# Patient Record
Sex: MALE | Race: WHITE | HISPANIC OR LATINO | URBAN - METROPOLITAN AREA
[De-identification: names, ages, dates, MRNs, and addresses within clinical notes are randomized per-mention and may not be internally consistent; named-entity substitution may affect disease eponyms.]

---

## 2019-06-22 ENCOUNTER — APPOINTMENT (RX ONLY)
Dept: URBAN - METROPOLITAN AREA CLINIC 154 | Facility: CLINIC | Age: 29
Setting detail: DERMATOLOGY
End: 2019-06-22

## 2019-06-22 DIAGNOSIS — D22 MELANOCYTIC NEVI: ICD-10-CM

## 2019-06-22 DIAGNOSIS — L72.0 EPIDERMAL CYST: ICD-10-CM | Status: INADEQUATELY CONTROLLED

## 2019-06-22 PROBLEM — D22.5 MELANOCYTIC NEVI OF TRUNK: Status: ACTIVE | Noted: 2019-06-22

## 2019-06-22 PROCEDURE — ? DEFER

## 2019-06-22 PROCEDURE — ? TREATMENT REGIMEN

## 2019-06-22 PROCEDURE — ? INVENTORY

## 2019-06-22 PROCEDURE — ? COUNSELING

## 2019-06-22 PROCEDURE — 99201: CPT

## 2019-06-22 PROCEDURE — ? PRESCRIPTION

## 2019-06-22 RX ORDER — MUPIROCIN 20 MG/G
OINTMENT TOPICAL BID
Qty: 1 | Refills: 1 | Status: ERX | COMMUNITY
Start: 2019-06-22

## 2019-06-22 RX ADMIN — MUPIROCIN: 20 OINTMENT TOPICAL at 12:49

## 2019-06-22 ASSESSMENT — LOCATION SIMPLE DESCRIPTION DERM
LOCATION SIMPLE: RIGHT BUTTOCK
LOCATION SIMPLE: GLUTEAL CLEFT

## 2019-06-22 ASSESSMENT — LOCATION ZONE DERM: LOCATION ZONE: TRUNK

## 2019-06-22 ASSESSMENT — LOCATION DETAILED DESCRIPTION DERM
LOCATION DETAILED: RIGHT MEDIAL BUTTOCK
LOCATION DETAILED: GLUTEAL CLEFT
LOCATION DETAILED: RIGHT BUTTOCK

## 2019-06-22 NOTE — PROCEDURE: MIPS QUALITY
Quality 431: Preventive Care And Screening: Unhealthy Alcohol Use - Screening: Patient screened for unhealthy alcohol use using a single question and scores less than 2 times per year
Detail Level: Detailed
Quality 226: Preventive Care And Screening: Tobacco Use: Screening And Cessation Intervention: Patient screened for tobacco use and is an ex/non-smoker
Quality 130: Documentation Of Current Medications In The Medical Record: Eligible clinician attests to documenting in the medical record the patient is not eligible for a current list of medications being obtained, updated, or reviewed by the eligible clinician

## 2019-06-22 NOTE — PROCEDURE: TREATMENT REGIMEN
Plan: Will plan excision and send in oral antibiotics at that time upon completion of surgery. Discussed risks of excision including but not limited to infection and bleeding, he verbally understood
Continue Regimen: Warm moist epsom salt compresses multiple times per day, mupirocin ointment bid
Detail Level: Zone

## 2019-07-03 ENCOUNTER — APPOINTMENT (RX ONLY)
Dept: URBAN - METROPOLITAN AREA CLINIC 155 | Facility: CLINIC | Age: 29
Setting detail: DERMATOLOGY
End: 2019-07-03

## 2019-07-03 DIAGNOSIS — L72.0 EPIDERMAL CYST: ICD-10-CM

## 2019-07-03 PROCEDURE — ? EXCISION

## 2019-07-03 PROCEDURE — 12032 INTMD RPR S/A/T/EXT 2.6-7.5: CPT

## 2019-07-03 PROCEDURE — 11404 EXC TR-EXT B9+MARG 3.1-4 CM: CPT

## 2019-07-03 PROCEDURE — ? EDUCATIONAL RESOURCES PROVIDED

## 2019-07-03 ASSESSMENT — LOCATION SIMPLE DESCRIPTION DERM: LOCATION SIMPLE: RIGHT BUTTOCK

## 2019-07-03 ASSESSMENT — LOCATION ZONE DERM: LOCATION ZONE: TRUNK

## 2019-07-03 ASSESSMENT — LOCATION DETAILED DESCRIPTION DERM: LOCATION DETAILED: RIGHT MEDIAL BUTTOCK

## 2019-07-09 ENCOUNTER — APPOINTMENT (RX ONLY)
Dept: URBAN - METROPOLITAN AREA CLINIC 155 | Facility: CLINIC | Age: 29
Setting detail: DERMATOLOGY
End: 2019-07-09

## 2019-07-09 DIAGNOSIS — Z48.817 ENCOUNTER FOR SURGICAL AFTERCARE FOLLOWING SURGERY ON THE SKIN AND SUBCUTANEOUS TISSUE: ICD-10-CM

## 2019-07-09 PROCEDURE — ? POST-OP WOUND EVALUATION

## 2019-07-09 PROCEDURE — 99024 POSTOP FOLLOW-UP VISIT: CPT

## 2019-07-09 PROCEDURE — ? ORDER TESTS

## 2019-07-09 PROCEDURE — ? PRESCRIPTION

## 2019-07-09 PROCEDURE — ? MEDICATION COUNSELING

## 2019-07-09 RX ORDER — DOXYCYCLINE HYCLATE 100 MG/1
CAPSULE, GELATIN COATED ORAL
Qty: 14 | Refills: 0 | Status: ERX | COMMUNITY
Start: 2019-07-09

## 2019-07-09 RX ADMIN — DOXYCYCLINE HYCLATE: 100 CAPSULE, GELATIN COATED ORAL at 15:03

## 2019-07-09 ASSESSMENT — LOCATION DETAILED DESCRIPTION DERM: LOCATION DETAILED: RIGHT MEDIAL BUTTOCK

## 2019-07-09 ASSESSMENT — LOCATION ZONE DERM: LOCATION ZONE: TRUNK

## 2019-07-09 ASSESSMENT — LOCATION SIMPLE DESCRIPTION DERM: LOCATION SIMPLE: RIGHT BUTTOCK

## 2019-07-09 NOTE — PROCEDURE: MEDICATION COUNSELING
Xelarunz Pregnancy And Lactation Text: This medication is Pregnancy Category D and is not considered safe during pregnancy.  The risk during breast feeding is also uncertain.

## 2019-07-09 NOTE — PROCEDURE: ORDER TESTS
Expected Date Of Service: 07/09/2019
Bill For Surgical Tray: no
Performing Laboratory: -15
Billing Type: Third-Party Bill

## 2019-07-09 NOTE — PROCEDURE: MEDICATION COUNSELING
Nausea subsiding   Clofazimine Counseling:  I discussed with the patient the risks of clofazimine including but not limited to skin and eye pigmentation, liver damage, nausea/vomiting, gastrointestinal bleeding and allergy.

## 2019-07-09 NOTE — PROCEDURE: POST-OP WOUND EVALUATION
Wound Discharge?: minimal discharge
Sutures?: intact
Wound Granulation?: early
Add 74397 Cpt? (Important Note: In 2017 The Use Of 05495 Is Being Tracked By Cms To Determine Future Global Period Reimbursement For Global Periods): yes
Wound Diameter In Cm(Optional): 0
Wound Edema?: moderate
Detail Level: Detailed
Wound Evaluated By (Optional): ALFONSO Sandra. Kristen DE GUZMAN
Wound Drainage?: purulent drainage
Wound Color?: red
Wound Crusting?: clean

## 2019-07-17 ENCOUNTER — APPOINTMENT (RX ONLY)
Dept: URBAN - METROPOLITAN AREA CLINIC 155 | Facility: CLINIC | Age: 29
Setting detail: DERMATOLOGY
End: 2019-07-17

## 2019-07-17 DIAGNOSIS — Z48.817 ENCOUNTER FOR SURGICAL AFTERCARE FOLLOWING SURGERY ON THE SKIN AND SUBCUTANEOUS TISSUE: ICD-10-CM

## 2019-07-17 PROCEDURE — ? POST-OP WOUND EVALUATION

## 2019-07-17 PROCEDURE — 99024 POSTOP FOLLOW-UP VISIT: CPT

## 2019-07-17 ASSESSMENT — LOCATION DETAILED DESCRIPTION DERM: LOCATION DETAILED: RIGHT MEDIAL BUTTOCK

## 2019-07-17 ASSESSMENT — LOCATION SIMPLE DESCRIPTION DERM: LOCATION SIMPLE: RIGHT BUTTOCK

## 2019-07-17 ASSESSMENT — LOCATION ZONE DERM: LOCATION ZONE: TRUNK

## 2019-07-17 NOTE — PROCEDURE: POST-OP WOUND EVALUATION
Detail Level: Detailed
Wound Color?: red
Wound Diameter In Cm(Optional): 0
Wound Discharge?: minimal discharge
Sutures?: intact
Wound Evaluated By (Optional): ALFONSO Sandra. Kristen DE GUZMAN
Wound Granulation?: early
Wound Crusting?: clean
Wound Edema?: moderate
Add 41892 Cpt? (Important Note: In 2017 The Use Of 33611 Is Being Tracked By Cms To Determine Future Global Period Reimbursement For Global Periods): yes
Wound Drainage?: purulent drainage

## 2019-07-23 ENCOUNTER — RX ONLY (OUTPATIENT)
Age: 29
Setting detail: RX ONLY
End: 2019-07-23

## 2019-07-23 RX ORDER — CIPROFLOXACIN 500 MG/1
TABLET, FILM COATED ORAL
Qty: 20 | Refills: 0 | Status: ERX | COMMUNITY
Start: 2019-07-23

## 2019-08-16 NOTE — PROCEDURE: MEDICATION COUNSELING
No Griseofulvin Counseling:  I discussed with the patient the risks of griseofulvin including but not limited to photosensitivity, cytopenia, liver damage, nausea/vomiting and severe allergy.  The patient understands that this medication is best absorbed when taken with a fatty meal (e.g., ice cream or french fries).

## 2019-11-22 NOTE — PROCEDURE: EXCISION
Verified patient's name, date of birth and allergies. Patient advised to remain in building 15 min after injection.     Medical Necessity Information: It is in your best interest to select a reason for this procedure from the list below. All of these items fulfill various CMS LCD requirements except lesion extends to a margin.

## 2020-05-03 NOTE — PROCEDURE: MEDICATION COUNSELING
Temo Roman(Resident) Albendazole Counseling:  I discussed with the patient the risks of albendazole including but not limited to cytopenia, kidney damage, nausea/vomiting and severe allergy.  The patient understands that this medication is being used in an off-label manner.

## 2021-04-06 NOTE — PROCEDURE: EXCISION
Advancement Flap (Single) Text: The defect edges were debeveled with a #15 scalpel blade.  Given the location of the defect and the proximity to free margins a single advancement flap was deemed most appropriate.  Using a sterile surgical marker, an appropriate advancement flap was drawn incorporating the defect and placing the expected incisions within the relaxed skin tension lines where possible.    The area thus outlined was incised deep to adipose tissue with a #15 scalpel blade.  The skin margins were undermined to an appropriate distance in all directions utilizing iris scissors. Muscle Hinge Flap Text: The defect edges were debeveled with a #15 scalpel blade.  Given the size, depth and location of the defect and the proximity to free margins a muscle hinge flap was deemed most appropriate.  Using a sterile surgical marker, an appropriate hinge flap was drawn incorporating the defect. The area thus outlined was incised with a #15 scalpel blade.  The skin margins were undermined to an appropriate distance in all directions utilizing iris scissors.

## 2022-10-20 NOTE — PROCEDURE: EXCISION
Do you want the patient to start it again tonight or tomorrow? Complex Repair And Single Advancement Flap Text: The defect edges were debeveled with a #15 scalpel blade.  The primary defect was closed partially with a complex linear closure.  Given the location of the remaining defect, shape of the defect and the proximity to free margins a single advancement flap was deemed most appropriate for complete closure of the defect.  Using a sterile surgical marker, an appropriate advancement flap was drawn incorporating the defect and placing the expected incisions within the relaxed skin tension lines where possible.    The area thus outlined was incised deep to adipose tissue with a #15 scalpel blade.  The skin margins were undermined to an appropriate distance in all directions utilizing iris scissors.